# Patient Record
Sex: MALE | Race: ASIAN | NOT HISPANIC OR LATINO | URBAN - METROPOLITAN AREA
[De-identification: names, ages, dates, MRNs, and addresses within clinical notes are randomized per-mention and may not be internally consistent; named-entity substitution may affect disease eponyms.]

---

## 2020-09-16 ENCOUNTER — TELEPHONE ENCOUNTER (OUTPATIENT)
Dept: URBAN - METROPOLITAN AREA CLINIC 92 | Facility: CLINIC | Age: 40
End: 2020-09-16

## 2020-09-16 ENCOUNTER — OFFICE VISIT (OUTPATIENT)
Dept: URBAN - METROPOLITAN AREA CLINIC 77 | Facility: CLINIC | Age: 40
End: 2020-09-16
Payer: COMMERCIAL

## 2020-09-16 ENCOUNTER — LAB OUTSIDE AN ENCOUNTER (OUTPATIENT)
Dept: URBAN - METROPOLITAN AREA CLINIC 78 | Facility: CLINIC | Age: 40
End: 2020-09-16

## 2020-09-16 DIAGNOSIS — B18.1 CARRIER OF HEPATITIS B: ICD-10-CM

## 2020-09-16 PROCEDURE — 76705 ECHO EXAM OF ABDOMEN: CPT | Performed by: INTERNAL MEDICINE

## 2020-09-16 RX ORDER — ENTECAVIR 0.5 MG/1
TAKE 1 TABLET (0.5 MG) BY ORAL ROUTE ONCE DAILY 1 HOUR BEFORE A MEAL OR 2 HOURS AFTER A MEAL FOR 90 DAYS TABLET, FILM COATED ORAL 1
Qty: 90 | Refills: 3 | Status: ACTIVE | COMMUNITY
Start: 2020-03-02 | End: 2021-02-25

## 2020-09-23 ENCOUNTER — OFFICE VISIT (OUTPATIENT)
Dept: URBAN - METROPOLITAN AREA CLINIC 78 | Facility: CLINIC | Age: 40
End: 2020-09-23
Payer: COMMERCIAL

## 2020-09-23 DIAGNOSIS — R93.5 ABNORMAL ULTRASOUND OF ABDOMEN: ICD-10-CM

## 2020-09-23 DIAGNOSIS — B19.10 HEPATITIS B: ICD-10-CM

## 2020-09-23 PROCEDURE — G8427 DOCREV CUR MEDS BY ELIG CLIN: HCPCS | Performed by: INTERNAL MEDICINE

## 2020-09-23 PROCEDURE — 99214 OFFICE O/P EST MOD 30 MIN: CPT | Performed by: INTERNAL MEDICINE

## 2020-09-23 PROCEDURE — G8420 CALC BMI NORM PARAMETERS: HCPCS | Performed by: INTERNAL MEDICINE

## 2020-09-23 RX ORDER — ENTECAVIR 0.5 MG/1
TAKE 1 TABLET (0.5 MG) BY ORAL ROUTE ONCE DAILY 1 HOUR BEFORE A MEAL OR 2 HOURS AFTER A MEAL FOR 90 DAYS TABLET, FILM COATED ORAL 1
Qty: 90 | Refills: 3 | Status: ACTIVE | COMMUNITY
Start: 2020-03-02 | End: 2021-02-25

## 2020-09-23 RX ORDER — ENTECAVIR 0.5 MG/1
TAKE 1 TABLET (0.5 MG) BY ORAL ROUTE ONCE DAILY 1 HOUR BEFORE A MEAL OR 2 HOURS AFTER A MEAL FOR 90 DAYS TABLET, FILM COATED ORAL 1
OUTPATIENT
Start: 2020-03-02 | End: 2021-02-25

## 2020-09-23 NOTE — HPI-TODAY'S VISIT:
Patient has a history of HBV Patient has been asymptomatic Patient has been having periodic labs and US Patient has been compliant with medications Patient has been abstaining from alcohol  Recent US shows a coarsened echotexture  Recent labs  HBsAg - pos HBsAb - neg HBeAg - neg HBeAb - pos  HBV - pending

## 2020-09-26 LAB
A/G RATIO: 1.6
ALBUMIN: 4.5
ALKALINE PHOSPHATASE: 67
ALT (SGPT): 23
AST (SGOT): 25
BILIRUBIN, TOTAL: 0.7
BUN/CREATININE RATIO: 11
BUN: 14
CALCIUM: 9.4
CARBON DIOXIDE, TOTAL: 25
CHLORIDE: 102
CREATININE: 1.3
EGFR IF AFRICN AM: 79
EGFR IF NONAFRICN AM: 68
GLOBULIN, TOTAL: 2.9
GLUCOSE: 94
HBSAG SCREEN: POSITIVE
HBV LOG10: (no result)
HEMATOCRIT: 45.6
HEMOGLOBIN: 15.8
HEP B SURFACE AB, QUAL: NON REACTIVE
HEP BE AB: POSITIVE
HEP BE AG: NEGATIVE
HEPATITIS B QUANTITATION: <10
MCH: 31.8
MCHC: 34.6
MCV: 92
NRBC: (no result)
PLATELETS: 177
POTASSIUM: 4.4
PROTEIN, TOTAL: 7.4
RBC: 4.97
RDW: 13.2
SODIUM: 139
TEST INFORMATION:: (no result)
WBC: 4.9

## 2022-04-12 ENCOUNTER — TELEPHONE ENCOUNTER (OUTPATIENT)
Dept: URBAN - METROPOLITAN AREA CLINIC 78 | Facility: CLINIC | Age: 42
End: 2022-04-12

## 2022-04-18 ENCOUNTER — ERX REFILL RESPONSE (OUTPATIENT)
Dept: URBAN - METROPOLITAN AREA CLINIC 78 | Facility: CLINIC | Age: 42
End: 2022-04-18

## 2022-04-18 RX ORDER — ENTECAVIR 0.5 MG/1
TAKE 1 TABLET BY MOUTH DAILY, 1 HOUR BEFORE MEAL OR 2 HOURS AFTER MEAL TABLET, FILM COATED ORAL
Qty: 90 TABLET | Refills: 1 | OUTPATIENT

## 2022-04-19 ENCOUNTER — LAB OUTSIDE AN ENCOUNTER (OUTPATIENT)
Dept: URBAN - METROPOLITAN AREA CLINIC 78 | Facility: CLINIC | Age: 42
End: 2022-04-19

## 2022-05-03 LAB
A/G RATIO: 1.4
ALBUMIN: 4.3
ALKALINE PHOSPHATASE: 71
ALT (SGPT): 22
AST (SGOT): 24
BILIRUBIN, TOTAL: 0.7
BUN/CREATININE RATIO: 10
BUN: 11
CALCIUM: 9.7
CARBON DIOXIDE, TOTAL: 23
CHLORIDE: 103
CREATININE: 1.07
EGFR: 89
GLOBULIN, TOTAL: 3.1
GLUCOSE: 84
HBSAG SCREEN: POSITIVE
HBV LOG10: (no result)
HEMATOCRIT: 48.5
HEMOGLOBIN: 15.8
HEP B CORE AB, IGM: NEGATIVE
HEP B CORE AB, TOT: POSITIVE
HEP B SURFACE AB, QUAL: NON REACTIVE
HEPATITIS B QUANTITATION: (no result)
INTERPRETATION: (no result)
MCH: 30.4
MCHC: 32.6
MCV: 93
NRBC: (no result)
PLATELETS: 191
POTASSIUM: 4.3
PROTEIN, TOTAL: 7.4
RBC: 5.19
RDW: 13.2
RFX TO HBC IGM: (no result)
SODIUM: 141
TEST INFORMATION:: (no result)
WBC: 5

## 2022-05-11 ENCOUNTER — OFFICE VISIT (OUTPATIENT)
Dept: URBAN - METROPOLITAN AREA CLINIC 77 | Facility: CLINIC | Age: 42
End: 2022-05-11
Payer: COMMERCIAL

## 2022-05-11 DIAGNOSIS — B18.1 CHRONIC HEPATITIS B: ICD-10-CM

## 2022-05-11 PROCEDURE — 76705 ECHO EXAM OF ABDOMEN: CPT | Performed by: INTERNAL MEDICINE

## 2022-05-11 RX ORDER — ENTECAVIR 0.5 MG/1
TAKE 1 TABLET BY MOUTH DAILY, 1 HOUR BEFORE MEAL OR 2 HOURS AFTER MEAL TABLET, FILM COATED ORAL
Qty: 90 TABLET | Refills: 1 | Status: ACTIVE | COMMUNITY

## 2022-08-10 ENCOUNTER — OFFICE VISIT (OUTPATIENT)
Dept: URBAN - METROPOLITAN AREA CLINIC 78 | Facility: CLINIC | Age: 42
End: 2022-08-10

## 2022-09-14 ENCOUNTER — OFFICE VISIT (OUTPATIENT)
Dept: URBAN - METROPOLITAN AREA CLINIC 78 | Facility: CLINIC | Age: 42
End: 2022-09-14

## 2022-10-14 ENCOUNTER — OFFICE VISIT (OUTPATIENT)
Dept: URBAN - METROPOLITAN AREA CLINIC 78 | Facility: CLINIC | Age: 42
End: 2022-10-14

## 2022-10-26 ENCOUNTER — DASHBOARD ENCOUNTERS (OUTPATIENT)
Age: 42
End: 2022-10-26

## 2022-10-26 ENCOUNTER — OFFICE VISIT (OUTPATIENT)
Dept: URBAN - METROPOLITAN AREA CLINIC 78 | Facility: CLINIC | Age: 42
End: 2022-10-26
Payer: COMMERCIAL

## 2022-10-26 ENCOUNTER — LAB OUTSIDE AN ENCOUNTER (OUTPATIENT)
Dept: URBAN - METROPOLITAN AREA CLINIC 78 | Facility: CLINIC | Age: 42
End: 2022-10-26

## 2022-10-26 VITALS
BODY MASS INDEX: 28.29 KG/M2 | DIASTOLIC BLOOD PRESSURE: 83 MMHG | SYSTOLIC BLOOD PRESSURE: 123 MMHG | WEIGHT: 202.1 LBS | HEART RATE: 79 BPM | HEIGHT: 71 IN

## 2022-10-26 DIAGNOSIS — B18.1 VIRAL HEPATITIS B: ICD-10-CM

## 2022-10-26 DIAGNOSIS — B19.10 HEPATITIS B: ICD-10-CM

## 2022-10-26 PROCEDURE — 99214 OFFICE O/P EST MOD 30 MIN: CPT | Performed by: INTERNAL MEDICINE

## 2022-10-26 RX ORDER — ENTECAVIR 0.5 MG/1
TAKE 1 TABLET BY MOUTH DAILY, 1 HOUR BEFORE MEAL OR 2 HOURS AFTER MEAL TABLET, FILM COATED ORAL
OUTPATIENT

## 2022-10-26 RX ORDER — ENTECAVIR 0.5 MG/1
TAKE 1 TABLET BY MOUTH DAILY, 1 HOUR BEFORE MEAL OR 2 HOURS AFTER MEAL TABLET, FILM COATED ORAL
Qty: 90 TABLET | Refills: 1 | Status: ACTIVE | COMMUNITY

## 2022-10-26 NOTE — HPI-TODAY'S VISIT:
Patient has a history of HBV Patient has been asymptomatic Patient has been having periodic labs and US Patient has been compliant with medications Patient has been abstaining from alcohol  Recent US shows a coarsened echotexture  Recent labs  HBsAg - pos HBsAb - neg HBeAb - pos  HBV - undetectable

## 2022-10-27 PROBLEM — 66071002 HEPATITIS B: Status: ACTIVE | Noted: 2022-04-25

## 2022-11-01 LAB
A/G RATIO: 1.5
ALBUMIN: 4.7
ALKALINE PHOSPHATASE: 86
ALT (SGPT): 21
AST (SGOT): 27
BILIRUBIN, TOTAL: 0.7
BUN/CREATININE RATIO: 8
BUN: 9
CALCIUM: 9.6
CARBON DIOXIDE, TOTAL: 22
CHLORIDE: 100
CREATININE: 1.1
EGFR: 86
GLOBULIN, TOTAL: 3.1
GLUCOSE: 84
HBSAG SCREEN: POSITIVE
HBV LOG10: 2.76
HEP B CORE AB, TOT: POSITIVE
HEP B SURFACE AB, QUAL: REACTIVE
HEP BE AB: POSITIVE
HEP BE AG: POSITIVE
HEPATITIS B QUANTITATION: 580
INTERPRETATION: (no result)
POTASSIUM: 4.3
PROTEIN, TOTAL: 7.8
REQUEST PROBLEM: (no result)
RFX TO HBC IGM: (no result)
SODIUM: 140
TEST INFORMATION:: (no result)

## 2022-12-21 ENCOUNTER — OFFICE VISIT (OUTPATIENT)
Dept: URBAN - METROPOLITAN AREA CLINIC 77 | Facility: CLINIC | Age: 42
End: 2022-12-21
Payer: COMMERCIAL

## 2022-12-21 DIAGNOSIS — B18.1 CHRONIC HEPATITIS B: ICD-10-CM

## 2022-12-21 PROCEDURE — 76705 ECHO EXAM OF ABDOMEN: CPT | Performed by: INTERNAL MEDICINE

## 2022-12-21 RX ORDER — ENTECAVIR 0.5 MG/1
TAKE 1 TABLET BY MOUTH DAILY, 1 HOUR BEFORE MEAL OR 2 HOURS AFTER MEAL TABLET, FILM COATED ORAL
Status: ACTIVE | COMMUNITY

## 2022-12-27 PROBLEM — 61977001 CHRONIC TYPE B VIRAL HEPATITIS: Status: ACTIVE | Noted: 2022-12-27

## 2024-11-25 ENCOUNTER — TELEPHONE ENCOUNTER (OUTPATIENT)
Dept: URBAN - METROPOLITAN AREA CLINIC 78 | Facility: CLINIC | Age: 44
End: 2024-11-25

## 2024-12-11 ENCOUNTER — OFFICE VISIT (OUTPATIENT)
Dept: URBAN - METROPOLITAN AREA CLINIC 78 | Facility: CLINIC | Age: 44
End: 2024-12-11

## 2024-12-11 VITALS — HEIGHT: 71 IN

## 2024-12-11 RX ORDER — ENTECAVIR 0.5 MG/1
TAKE 1 TABLET BY MOUTH DAILY, 1 HOUR BEFORE MEAL OR 2 HOURS AFTER MEAL TABLET, FILM COATED ORAL
COMMUNITY

## 2024-12-11 RX ORDER — ENTECAVIR 0.5 MG/1
TAKE 1 TABLET BY MOUTH DAILY, 1 HOUR BEFORE MEAL OR 2 HOURS AFTER MEAL TABLET, FILM COATED ORAL
OUTPATIENT

## 2024-12-11 NOTE — HPI-TODAY'S VISIT:
Patient has a history of HBV Patient has been asymptomatic Patient has been having periodic labs and US Patient has been compliant with medications Patient has been abstaining from alcohol

## 2025-03-14 ENCOUNTER — OFFICE VISIT (OUTPATIENT)
Dept: URBAN - METROPOLITAN AREA CLINIC 78 | Facility: CLINIC | Age: 45
End: 2025-03-14

## 2025-03-19 ENCOUNTER — LAB OUTSIDE AN ENCOUNTER (OUTPATIENT)
Dept: URBAN - METROPOLITAN AREA CLINIC 78 | Facility: CLINIC | Age: 45
End: 2025-03-19

## 2025-03-19 ENCOUNTER — OFFICE VISIT (OUTPATIENT)
Dept: URBAN - METROPOLITAN AREA CLINIC 78 | Facility: CLINIC | Age: 45
End: 2025-03-19
Payer: COMMERCIAL

## 2025-03-19 VITALS
BODY MASS INDEX: 29.26 KG/M2 | HEART RATE: 84 BPM | DIASTOLIC BLOOD PRESSURE: 82 MMHG | SYSTOLIC BLOOD PRESSURE: 124 MMHG | HEIGHT: 71 IN | TEMPERATURE: 98 F | WEIGHT: 209 LBS

## 2025-03-19 DIAGNOSIS — B18.1 VIRAL HEPATITIS B: ICD-10-CM

## 2025-03-19 PROCEDURE — 99214 OFFICE O/P EST MOD 30 MIN: CPT | Performed by: INTERNAL MEDICINE

## 2025-03-19 RX ORDER — ENTECAVIR 0.5 MG/1
TAKE 1 TABLET BY MOUTH DAILY, 1 HOUR BEFORE MEAL OR 2 HOURS AFTER MEAL TABLET, FILM COATED ORAL
Status: ACTIVE | COMMUNITY

## 2025-03-19 RX ORDER — ENTECAVIR 0.5 MG/1
1 TABLET ON AN EMPTY STOMACH TABLET, FILM COATED ORAL ONCE A DAY
Qty: 90 | Refills: 3 | OUTPATIENT

## 2025-03-26 LAB
ALBUMIN: 4.3
ALKALINE PHOSPHATASE: 72
ALT (SGPT): 194
AST (SGOT): 92
BASO (ABSOLUTE): 0
BASOS: 1
BILIRUBIN, TOTAL: 0.4
BUN/CREATININE RATIO: 9
BUN: 10
CALCIUM: 9
CARBON DIOXIDE, TOTAL: 23
CHLORIDE: 107
CREATININE: 1.12
EGFR: 83
EOS (ABSOLUTE): 0.2
EOS: 4
GLOBULIN, TOTAL: 2.8
GLUCOSE: 84
HBSAG CONFIRMATION: POSITIVE
HBSAG SCREEN: (no result)
HBV GENOTYPE: (no result)
HBV GENOTYPE: (no result)
HBV IU/ML: (no result)
HBV IU/ML: (no result)
HBV PRECORE MUTATION: (no result)
HCV AB: NON REACTIVE
HEMATOCRIT: 45.3
HEMATOLOGY COMMENTS:: (no result)
HEMOGLOBIN: 14.5
HEP A AB, IGM: NEGATIVE
HEP B CORE AB, IGM: NEGATIVE
HEP B CORE AB, TOT: POSITIVE
HEP B SURFACE AB, QUAL: NON REACTIVE
HEP BE AB: REACTIVE
HEP BE AG: NEGATIVE
IMMATURE CELLS: (no result)
IMMATURE GRANS (ABS): 0
IMMATURE GRANULOCYTES: 1
INTERPRETATION:: (no result)
LOG10 HBV AS IU/ML: (no result)
LOG10 HBV IU/ML: 7.99
LYMPHS (ABSOLUTE): 1.8
LYMPHS: 34
MCH: 30.9
MCHC: 32
MCV: 96
MONOCYTES(ABSOLUTE): 0.4
MONOCYTES: 7
NEUTROPHILS (ABSOLUTE): 2.9
NEUTROPHILS: 53
NRBC: (no result)
PLATELETS: 182
POTASSIUM: 4.7
PROTEIN, TOTAL: 7.1
RBC: 4.7
RDW: 13.1
SODIUM: 142
TEST INFORMATION:: (no result)
WBC: 5.4

## 2025-04-02 ENCOUNTER — OFFICE VISIT (OUTPATIENT)
Dept: URBAN - METROPOLITAN AREA CLINIC 78 | Facility: CLINIC | Age: 45
End: 2025-04-02
Payer: COMMERCIAL

## 2025-04-02 ENCOUNTER — LAB OUTSIDE AN ENCOUNTER (OUTPATIENT)
Dept: URBAN - METROPOLITAN AREA CLINIC 78 | Facility: CLINIC | Age: 45
End: 2025-04-02

## 2025-04-02 DIAGNOSIS — B19.10 HEPATITIS B: ICD-10-CM

## 2025-04-02 DIAGNOSIS — R10.13 EPIGASTRIC DISCOMFORT: ICD-10-CM

## 2025-04-02 PROCEDURE — 99214 OFFICE O/P EST MOD 30 MIN: CPT | Performed by: INTERNAL MEDICINE

## 2025-04-02 RX ORDER — ENTECAVIR 0.5 MG/1
1 TABLET ON AN EMPTY STOMACH TABLET, FILM COATED ORAL ONCE A DAY
Qty: 90 | Refills: 3 | Status: ACTIVE | COMMUNITY

## 2025-04-02 RX ORDER — FAMOTIDINE 40 MG/1
1 TABLET AT BEDTIME TABLET, FILM COATED ORAL ONCE A DAY
Qty: 90 | Refills: 0 | OUTPATIENT
Start: 2025-04-02

## 2025-04-02 RX ORDER — ENTECAVIR 0.5 MG/1
1 TABLET ON AN EMPTY STOMACH TABLET, FILM COATED ORAL ONCE A DAY
Qty: 90 | Refills: 3 | OUTPATIENT
Start: 2025-04-02 | End: 2026-03-28

## 2025-04-02 NOTE — HPI-TODAY'S VISIT:
Paient is here in f/u He c/o epigastric discomfort mara in the mornings  Recent labs show elevation of HBV count and LFTs HBeAb is pos HBeAg is pos HBsAg is pos

## 2025-04-15 ENCOUNTER — OFFICE VISIT (OUTPATIENT)
Dept: URBAN - METROPOLITAN AREA CLINIC 77 | Facility: CLINIC | Age: 45
End: 2025-04-15
Payer: COMMERCIAL

## 2025-04-15 DIAGNOSIS — B18.1 VIRAL HEPATITIS B: ICD-10-CM

## 2025-04-15 PROCEDURE — 76705 ECHO EXAM OF ABDOMEN: CPT | Performed by: INTERNAL MEDICINE

## 2025-04-15 RX ORDER — FAMOTIDINE 40 MG/1
1 TABLET AT BEDTIME TABLET, FILM COATED ORAL ONCE A DAY
Qty: 90 | Refills: 0 | Status: ACTIVE | COMMUNITY
Start: 2025-04-02

## 2025-04-15 RX ORDER — ENTECAVIR 0.5 MG/1
1 TABLET ON AN EMPTY STOMACH TABLET, FILM COATED ORAL ONCE A DAY
Qty: 90 | Refills: 3 | Status: ACTIVE | COMMUNITY
Start: 2025-04-02 | End: 2026-03-28